# Patient Record
Sex: MALE | Race: WHITE | NOT HISPANIC OR LATINO | URBAN - METROPOLITAN AREA
[De-identification: names, ages, dates, MRNs, and addresses within clinical notes are randomized per-mention and may not be internally consistent; named-entity substitution may affect disease eponyms.]

---

## 2017-08-08 ENCOUNTER — NEW PATIENT (OUTPATIENT)
Dept: URBAN - METROPOLITAN AREA CLINIC 44 | Facility: CLINIC | Age: 52
End: 2017-08-08
Payer: COMMERCIAL

## 2017-08-08 PROCEDURE — 92004 COMPRE OPH EXAM NEW PT 1/>: CPT | Performed by: OPTOMETRIST

## 2017-08-08 PROCEDURE — 92250 FUNDUS PHOTOGRAPHY W/I&R: CPT | Performed by: OPTOMETRIST

## 2017-08-08 ASSESSMENT — KERATOMETRY
OS: 45.50
OD: 45.75

## 2017-08-08 ASSESSMENT — VISUAL ACUITY
OS: 20/150
OD: 20/20

## 2017-08-08 ASSESSMENT — INTRAOCULAR PRESSURE
OS: 16
OD: 18

## 2017-09-15 ENCOUNTER — Encounter (OUTPATIENT)
Dept: URBAN - METROPOLITAN AREA CLINIC 44 | Facility: CLINIC | Age: 52
End: 2017-09-15
Payer: COMMERCIAL

## 2017-09-15 DIAGNOSIS — H25.13 AGE-RELATED NUCLEAR CATARACT, BILATERAL: Primary | ICD-10-CM

## 2017-09-15 PROCEDURE — 92025 CPTRIZED CORNEAL TOPOGRAPHY: CPT | Performed by: OPHTHALMOLOGY

## 2017-09-15 PROCEDURE — 99213 OFFICE O/P EST LOW 20 MIN: CPT | Performed by: PHYSICIAN ASSISTANT

## 2017-09-15 RX ORDER — AMLODIPINE BESYLATE 10 MG/1
10 MG TABLET ORAL
Qty: 0 | Refills: 0 | Status: INACTIVE
Start: 2017-09-15 | End: 2018-12-28

## 2017-09-15 RX ORDER — LISINOPRIL 20 MG/1
20 MG TABLET ORAL
Qty: 0 | Refills: 0 | Status: INACTIVE
Start: 2017-09-15 | End: 2018-12-28

## 2017-09-26 ENCOUNTER — FOLLOW UP ESTABLISHED (OUTPATIENT)
Dept: URBAN - METROPOLITAN AREA CLINIC 44 | Facility: CLINIC | Age: 52
End: 2017-09-26
Payer: COMMERCIAL

## 2017-09-26 DIAGNOSIS — H43.393 OTHER VITREOUS OPACITIES, BILATERAL: ICD-10-CM

## 2017-09-26 DIAGNOSIS — H52.223 REGULAR ASTIGMATISM, BILATERAL: ICD-10-CM

## 2017-09-26 DIAGNOSIS — Z79.4 LONG TERM (CURRENT) USE OF INSULIN: ICD-10-CM

## 2017-09-26 DIAGNOSIS — H25.12 AGE-RELATED NUCLEAR CATARACT, LEFT EYE: Primary | ICD-10-CM

## 2017-09-26 DIAGNOSIS — H52.213 IRREGULAR ASTIGMATISM, BILATERAL: ICD-10-CM

## 2017-09-26 PROCEDURE — 92012 INTRM OPH EXAM EST PATIENT: CPT | Performed by: OPHTHALMOLOGY

## 2017-09-26 ASSESSMENT — INTRAOCULAR PRESSURE
OD: 18
OS: 18

## 2017-10-05 ENCOUNTER — SURGERY (OUTPATIENT)
Dept: URBAN - METROPOLITAN AREA SURGERY 19 | Facility: SURGERY | Age: 52
End: 2017-10-05
Payer: COMMERCIAL

## 2017-10-05 PROCEDURE — 66984 XCAPSL CTRC RMVL W/O ECP: CPT | Performed by: OPHTHALMOLOGY

## 2017-10-06 ENCOUNTER — POST OP (OUTPATIENT)
Dept: URBAN - METROPOLITAN AREA CLINIC 44 | Facility: CLINIC | Age: 52
End: 2017-10-06

## 2017-10-06 PROCEDURE — 99024 POSTOP FOLLOW-UP VISIT: CPT | Performed by: OPTOMETRIST

## 2017-10-06 ASSESSMENT — INTRAOCULAR PRESSURE: OS: 24

## 2017-10-10 ENCOUNTER — POST OP (OUTPATIENT)
Dept: URBAN - METROPOLITAN AREA CLINIC 44 | Facility: CLINIC | Age: 52
End: 2017-10-10

## 2017-10-10 DIAGNOSIS — Z96.1 PRESENCE OF INTRAOCULAR LENS: Primary | ICD-10-CM

## 2017-10-10 PROCEDURE — 99024 POSTOP FOLLOW-UP VISIT: CPT | Performed by: OPTOMETRIST

## 2017-10-10 ASSESSMENT — INTRAOCULAR PRESSURE
OD: 16
OS: 16

## 2017-10-10 ASSESSMENT — VISUAL ACUITY
OS: 20/20
OD: 20/20

## 2017-11-08 ENCOUNTER — POST OP (OUTPATIENT)
Dept: URBAN - METROPOLITAN AREA CLINIC 44 | Facility: CLINIC | Age: 52
End: 2017-11-08

## 2017-11-08 PROCEDURE — 92015 DETERMINE REFRACTIVE STATE: CPT | Performed by: OPTOMETRIST

## 2017-11-08 PROCEDURE — 99024 POSTOP FOLLOW-UP VISIT: CPT | Performed by: OPTOMETRIST

## 2017-11-08 ASSESSMENT — VISUAL ACUITY
OD: 20/25
OS: 20/20

## 2017-11-08 ASSESSMENT — INTRAOCULAR PRESSURE
OD: 15
OS: 15

## 2018-12-07 ENCOUNTER — FOLLOW UP ESTABLISHED (OUTPATIENT)
Dept: URBAN - METROPOLITAN AREA CLINIC 44 | Facility: CLINIC | Age: 53
End: 2018-12-07
Payer: COMMERCIAL

## 2018-12-07 DIAGNOSIS — H26.492 OTHER SECONDARY CATARACT, LEFT EYE: ICD-10-CM

## 2018-12-07 DIAGNOSIS — E11.9 TYPE 2 DIABETES MELLITUS WITHOUT COMPLICATIONS: ICD-10-CM

## 2018-12-07 DIAGNOSIS — H25.11 AGE-RELATED NUCLEAR CATARACT, RIGHT EYE: Primary | ICD-10-CM

## 2018-12-07 PROCEDURE — 92014 COMPRE OPH EXAM EST PT 1/>: CPT | Performed by: OPTOMETRIST

## 2018-12-07 ASSESSMENT — KERATOMETRY
OD: 45.13
OS: 45.38

## 2018-12-07 ASSESSMENT — VISUAL ACUITY
OD: 20/20
OS: 20/30

## 2018-12-07 ASSESSMENT — INTRAOCULAR PRESSURE
OS: 17
OD: 17

## 2018-12-28 ENCOUNTER — Encounter (OUTPATIENT)
Dept: URBAN - METROPOLITAN AREA CLINIC 44 | Facility: CLINIC | Age: 53
End: 2018-12-28
Payer: COMMERCIAL

## 2018-12-28 DIAGNOSIS — Z01.818 ENCOUNTER FOR OTHER PREPROCEDURAL EXAMINATION: Primary | ICD-10-CM

## 2018-12-28 PROCEDURE — 99213 OFFICE O/P EST LOW 20 MIN: CPT | Performed by: PHYSICIAN ASSISTANT

## 2019-01-14 ENCOUNTER — SURGERY (OUTPATIENT)
Dept: URBAN - METROPOLITAN AREA SURGERY 19 | Facility: SURGERY | Age: 54
End: 2019-01-14
Payer: COMMERCIAL

## 2019-01-14 PROCEDURE — 66821 AFTER CATARACT LASER SURGERY: CPT | Performed by: OPHTHALMOLOGY

## 2020-12-07 PROBLEM — G62.9 SENSORY NEUROPATHY: Status: ACTIVE | Noted: 2020-12-07

## 2021-10-20 ENCOUNTER — HOSPITAL ENCOUNTER (OUTPATIENT)
Dept: MRI IMAGING | Age: 56
Discharge: HOME | End: 2021-10-20
Attending: PSYCHIATRY & NEUROLOGY

## 2021-10-20 DIAGNOSIS — M48.02 CERVICAL STENOSIS OF SPINAL CANAL: ICD-10-CM

## 2021-10-20 PROCEDURE — 72141 MRI NECK SPINE W/O DYE: CPT

## 2022-03-18 PROBLEM — G62.9 SENSORY NEUROPATHY: Status: ACTIVE | Noted: 2020-12-07

## 2022-06-13 ENCOUNTER — TELEPHONE (OUTPATIENT)
Dept: NEUROLOGY | Age: 57
End: 2022-06-13

## 2022-06-13 DIAGNOSIS — M79.2 NEUROPATHIC PAIN: Primary | ICD-10-CM

## 2022-06-13 NOTE — TELEPHONE ENCOUNTER
Patient needs Dr. Kevin Adams to authorize an early fill on his Norco on the 15th because he is leaving town. He is using [facility]    His next appt is 11/4/22.

## 2022-06-14 RX ORDER — HYDROCODONE BITARTRATE AND ACETAMINOPHEN 10; 325 MG/1; MG/1
1 TABLET ORAL EVERY 8 HOURS PRN
Qty: 90 TABLET | Refills: 0 | Status: SHIPPED | OUTPATIENT
Start: 2022-06-14 | End: 2022-07-14

## 2022-06-15 ENCOUNTER — PATIENT MESSAGE (OUTPATIENT)
Dept: NEUROLOGY | Age: 57
End: 2022-06-15

## 2022-06-15 NOTE — TELEPHONE ENCOUNTER
From: Santino Trimble  To: Dr. Joy Blevins: 6/15/2022 11:39 AM EDT  Subject: Earlier Discussion    Yes, I had called on Monday and spoke with Gaviota about a prescription that needs to be refilled today due to myself going on vacation and I wouldn't be back until after the original refill date. She talked to Dr. Steven Flanagan and the request was sent to [facility] in [facility], so I called them just a minute ago and they said that they needed to get an approval from Dr. Steven Flanagan and then they could fill it. Would someone please contact them and tell them to do this.    Thanks   Roger Friedman

## 2022-08-10 ENCOUNTER — PATIENT MESSAGE (OUTPATIENT)
Dept: NEUROLOGY | Age: 57
End: 2022-08-10

## 2022-08-10 DIAGNOSIS — M79.2 NEUROPATHIC PAIN: Primary | ICD-10-CM

## 2022-08-11 RX ORDER — HYDROCODONE BITARTRATE AND ACETAMINOPHEN 10; 325 MG/1; MG/1
1 TABLET ORAL EVERY 8 HOURS PRN
Qty: 90 TABLET | Refills: 0 | Status: SHIPPED | OUTPATIENT
Start: 2022-08-13 | End: 2022-09-06 | Stop reason: SDUPTHER

## 2022-08-11 NOTE — TELEPHONE ENCOUNTER
Sharon Hogan MA 8/10/2022 11:03 AM EDT      ----- Message -----  From: Nelson Fabian  Sent: 8/10/2022 4:56 AM EDT  To: , *  Subject: Yojana Haynes,  Would you please refill my Norco 10/325? This request is early due to myself being unavailable for a couple of days. Last month I picked up my RX on 7/15/22, so the way my meds calculate out, the 30 days supply for July ends 8/13/22. Could you check this request and have my pharmacy [facility]) fill it on 8/13? If you will do this, it will be greatly appreciated. My appointment with you still stands on 11/4/22 @ 10:45. Thanks in advance.   Char Hudson

## 2022-10-09 ENCOUNTER — PATIENT MESSAGE (OUTPATIENT)
Dept: NEUROLOGY | Age: 57
End: 2022-10-09

## 2022-10-09 DIAGNOSIS — M79.2 NEUROPATHIC PAIN: ICD-10-CM

## 2022-10-10 NOTE — TELEPHONE ENCOUNTER
Request received via [facility]    Chart reviewed. .  Pharmacy up to date.     Scheduled F/U: 11/4/2022  Last Seen:   4/26/2022

## 2022-10-10 NOTE — TELEPHONE ENCOUNTER
From: Sandy Quiñones  To: Dr. Wing Lisbon: 10/9/2022 4:19 AM EDT  Subject: Altaf Eid,   Could you please have my Norco medication 10/325 refilled? I picked up Septembers prescription on 9/13/22. October's request would be due on 10/13/22. My pharmacy is [facility] in [facility]. Thanks in advance and I'll seeya next month.    Best wishes,   Makayla Neil

## 2022-10-11 RX ORDER — HYDROCODONE BITARTRATE AND ACETAMINOPHEN 10; 325 MG/1; MG/1
1 TABLET ORAL EVERY 8 HOURS PRN
Qty: 90 TABLET | Refills: 0 | Status: SHIPPED | OUTPATIENT
Start: 2022-11-12 | End: 2022-12-12

## 2022-10-11 RX ORDER — HYDROCODONE BITARTRATE AND ACETAMINOPHEN 10; 325 MG/1; MG/1
1 TABLET ORAL EVERY 8 HOURS PRN
Qty: 90 TABLET | Refills: 0 | Status: SHIPPED | OUTPATIENT
Start: 2022-10-13 | End: 2022-11-12

## 2022-11-04 ENCOUNTER — PATIENT MESSAGE (OUTPATIENT)
Dept: NEUROLOGY | Age: 57
End: 2022-11-04

## 2022-11-04 ENCOUNTER — OFFICE VISIT (OUTPATIENT)
Dept: NEUROLOGY | Age: 57
End: 2022-11-04

## 2022-11-04 VITALS
WEIGHT: 179.6 LBS | BODY MASS INDEX: 25.77 KG/M2 | HEART RATE: 77 BPM | SYSTOLIC BLOOD PRESSURE: 133 MMHG | DIASTOLIC BLOOD PRESSURE: 92 MMHG

## 2022-11-04 DIAGNOSIS — G60.8 PERIPHERAL SENSORY NEUROPATHY: ICD-10-CM

## 2022-11-04 DIAGNOSIS — M79.2 NEUROPATHIC PAIN: Primary | ICD-10-CM

## 2022-11-04 DIAGNOSIS — M48.02 CERVICAL STENOSIS OF SPINAL CANAL: ICD-10-CM

## 2022-11-04 PROCEDURE — 99213 OFFICE O/P EST LOW 20 MIN: CPT | Performed by: PSYCHIATRY & NEUROLOGY

## 2022-11-04 ASSESSMENT — PATIENT HEALTH QUESTIONNAIRE - PHQ9
SUM OF ALL RESPONSES TO PHQ QUESTIONS 1-9: 0
SUM OF ALL RESPONSES TO PHQ QUESTIONS 1-9: 0
SUM OF ALL RESPONSES TO PHQ9 QUESTIONS 1 & 2: 0
2. FEELING DOWN, DEPRESSED OR HOPELESS: 0
1. LITTLE INTEREST OR PLEASURE IN DOING THINGS: 0
SUM OF ALL RESPONSES TO PHQ QUESTIONS 1-9: 0
SUM OF ALL RESPONSES TO PHQ QUESTIONS 1-9: 0

## 2022-11-04 NOTE — PROGRESS NOTES
Venkat Cameron   [address], [address] [facility]  Phone: [phone] Fax [phone]  Damaso Cha MD      Patient: Todd Campbell  Provider: Damaso Cha MD    CC:   Chief Complaint   Patient presents with    Follow-up     Neuralgia and neuritis  Lesion of plantar nerve, unspecified lower limb     Referring Provider:    History of Present Illness:     Todd Campbell is a 62 y.o. RH male who presents for follow-up of peripheral neuropathy. He is unaccompanied for today's visit. He he was last seen April 2022. He presents for follow-up and continued management of a painful peripheral neuropathy affecting his feet. Previous nerve conduction studies are reviewed below did show evidence of relatively mild plantar neuropathy bilaterally. He also has fairly significant degenerative disc disease of the cervical spine with central canal stenosis s/p fusion C5-6 and C6-7. Current medications include:  Gabapentin 600 mg 3 times a day  Hydrocodone-acetaminophen  mg 3 times a day as needed     Previous medication trials include: Pregabalin    Patient presents today for follow-up. Overall things been relatively stable. Continues to take hydrocodone-acetaminophen in addition to gabapentin for painful peripheral neuropathy of his feet. Pain can be worse when standing for prolonged periods of time as well as with walking. He has some chronic neck pain and stiffness which remains relatively stable. MRI of the cervical spine has shown areas of stenosis with cord encroachment at levels above an area of a prior cervical fusion. He has been hesitant about any neurosurgery referrals as symptoms have been relatively stable so we have been continuing to monitor for any worsening signs. Of note, he does have a history of a right wrist drop, suspected to be an isolated radial nerve palsy, which appears to have resolved.        Review of Systems:   Review of Systems   Constitutional: Negative for fever. HENT:  Negative for voice change. Eyes:  Negative for visual disturbance. Respiratory:  Negative for cough. Cardiovascular:  Negative for chest pain. Gastrointestinal:  Negative for abdominal pain. Genitourinary:  Negative for frequency. Musculoskeletal:  Positive for arthralgias, gait problem and myalgias. Skin:  Negative for rash. Neurological:  Positive for numbness. Negative for tremors. Psychiatric/Behavioral:  Negative for decreased concentration and sleep disturbance. Lab/Imaging Review:   I REVIEWED PERTINENT LABS, IMAGES, AND REPORTS WITH THE PATIENT PERSONALLY, DIRECTLY AND FULLY. THE MOST PERTINENT FINDINGS ARE NOTED BELOW:    MRI Cervical Spine October 2021:  IMPRESSION  1. Postop changes related to C5-C7 anterior discectomy and fusion. No spinal canal stenosis at these levels. 2. Moderate spinal canal stenosis at the C3-4 and C4-5 levels due to disc  osteophyte complex at each level. Neural foraminal stenosis at these levels also noted as described above. 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 MRI Cervical Spine September 2020:  1. ACDF spanning C5-C7. No significant central canal or neuroforaminal stenosis at these levels. 2.  At C3-C4, there is a moderate central canal stenosis and severe bilateral neuroforaminal stenosis. 3.  At C4-C5, there is severe central canal stenosis and severe bilateral neuroforaminal stenosis. 4.  At C7-T1, there is mild to moderate bilateral neuroforaminal stenosis. MRI Brain August 2020:  1. No acute intracranial abnormality. 2.  There are a few opacified right mastoid air cells. MRI Lumbar Spine July 2020:  1.   Small to moderate right paracentral disc extrusion appears to abut immediately displace the proximal right S1 nerve root within the moderately narrowed right lateral recess when combined with facet hypertrophy and ligamentum flavum hypertrophy. 2.  Mild diffuse disc bulging at multiple lumbar levels with mild bilateral inferior neural foraminal narrowing and spinal canal narrowing but no nerve root impingement. Past Medical History:     Past medical history, surgical history, social history, family history, medications, and allergies were reviewed and updated as appropriate. PAST MEDICAL HISTORY:  Past Medical History:   Diagnosis Date    Hypertension     Sensory neuropathy 12/7/2020     PAST SURGICAL HISTORY:   Past Surgical History:   Procedure Laterality Date    CERVICAL FUSION       FAMILY HISTORY:  No family history on file. SOCIAL HISTORY:  Social History     Socioeconomic History    Marital status:      Spouse name: None    Number of children: None    Years of education: None    Highest education level: None   Tobacco Use    Smoking status: Former     Types: Cigarettes     Quit date: 4/2/2012     Years since quitting: 10.6    Smokeless tobacco: Current    Tobacco comments:     Quit smoking: dip   Substance and Sexual Activity    Alcohol use: Never       Medications/Allergies:     MEDICATIONS:   Outpatient Encounter Medications as of 11/4/2022   Medication Sig Dispense Refill    [START ON 12/12/2022] HYDROcodone-acetaminophen (NORCO)  MG per tablet Take 1 tablet by mouth every 8 hours as needed for Pain for up to 30 days. Intended supply: 30 days 90 tablet 0    HYDROcodone-acetaminophen (NORCO)  MG per tablet Take 1 tablet by mouth every 8 hours as needed for Pain for up to 30 days. Intended supply: 30 days 90 tablet 0    [START ON 11/12/2022] HYDROcodone-acetaminophen (NORCO)  MG per tablet Take 1 tablet by mouth every 8 hours as needed for Pain for up to 30 days.  Intended supply: 30 days 90 tablet 0    escitalopram (LEXAPRO) 20 MG tablet Take 20 mg by mouth daily      gabapentin (NEURONTIN) 300 MG capsule Take 600 mg by mouth 3 times daily. lisinopril (PRINIVIL;ZESTRIL) 20 MG tablet Take 20 mg by mouth daily       No facility-administered encounter medications on file as of 11/4/2022. ALLERGIES:  No Known Allergies      Physical Exam:     BP (!) 133/92 (Site: Right Upper Arm, Position: Sitting)   Pulse 77   Wt 179 lb 9.6 oz (81.5 kg)   BMI 25.77 kg/m²     General Exam:  General: Well developed, well nourished, in no apparent distress. HEENT: Normocephalic, atraumatic. Sclera anicteric. Oropharynx clear. Neck: Supple without masses  Cardiovascular: Regular rate and rhythm. No carotid bruits. Lungs: Non-labored breathing. Abdomen: Soft, nontender, nondistended. Extremities: Peripheral pulses intact. No edema and no rashes. Neurological Exam:      MS/Language/Speech:  Alert. Oriented to person, place, and time. Language fluent. Speech normal.      Cranial Nerves: PERRL. Eye movements full with normal pursuits. No nystagmus. Face was symmetric with good activation and normal sensation. Tongue and palate were midline. Shoulder shrug symmetric. Motor: Strength in the upper extremities is intact. Tone continues to be normal.  Strength in the lower extremities appears intact. Abnormal Movements: There was no tremor or hyperkinetic movements. Sensory: Loss of pinprick and mild loss of vibratory sensation of the distal lower extremities bilaterally. Cerebellar: No ataxia or dysmetria. Reflexes (R/L): Biceps (1+/1+), Brachioradialis (1+/1+), Patellar (1+/1+), Ankles (0+/0+). Millard's was negative. Toes mute. Gait: He has no difficulty rising from a chair but he clearly walks with an antalgic gait due to bilateral foot pain. Assessment and Plan:     Boubacar Mosley is a 62 y.o. male who presents with the following issues:     Jose A Leija was seen today for follow-up. Diagnoses and all orders for this visit:    Neuropathic pain  -     HYDROcodone-acetaminophen (NORCO)  MG per tablet;  Take 1 tablet by mouth every 8 hours as needed for Pain for up to 30 days. Intended supply: 30 days    Peripheral sensory neuropathy  -     HYDROcodone-acetaminophen (NORCO)  MG per tablet; Take 1 tablet by mouth every 8 hours as needed for Pain for up to 30 days. Intended supply: 30 days    Cervical stenosis of spinal canal       He presents for follow-up and continued management of a painful sensory neuropathy affecting the plantar nerves of both feet. Would consider repeat nerve conduction studies to look for interval progression should symptoms worsen. In the meantime, we will continue symptom management with Norco as needed as well as gabapentin 600 mg 3 times a day. MRI of the cervical spine has been reviewed. There are areas of moderate central canal stenosis at C3-C4 and C4-C5 due to disc osteophyte complexes at these levels. There is also multilevel neuroforaminal stenosis. We have discussed the possibility of a repeat neurosurgery referral but we will hold off for now as symptoms have been relatively stable. Follow up in 6 months. Signature: Gabino Hill MD      Date:  11/5/2022    [address] [place] [address] Neurology   [address]  Ph: [phone]  Fax: [phone]         I have personally interviewed and examined Mr. Rafia Rubin and I have personally reviewed all relevant records including labs and imaging as noted above. I have written all aspects of this note. More than 50% of this time was used for counseling regarding my diagnosis, prognosis, and plans for management. Total visit time: 25 minutes.

## 2022-11-05 RX ORDER — HYDROCODONE BITARTRATE AND ACETAMINOPHEN 10; 325 MG/1; MG/1
1 TABLET ORAL EVERY 8 HOURS PRN
Qty: 90 TABLET | Refills: 0 | Status: SHIPPED | OUTPATIENT
Start: 2022-12-12 | End: 2023-01-11

## 2022-11-05 ASSESSMENT — ENCOUNTER SYMPTOMS
COUGH: 0
ABDOMINAL PAIN: 0
VOICE CHANGE: 0

## 2023-01-05 DIAGNOSIS — G60.8 PERIPHERAL SENSORY NEUROPATHY: ICD-10-CM

## 2023-01-05 DIAGNOSIS — M79.2 NEUROPATHIC PAIN: ICD-10-CM

## 2023-01-06 RX ORDER — HYDROCODONE BITARTRATE AND ACETAMINOPHEN 10; 325 MG/1; MG/1
1 TABLET ORAL EVERY 8 HOURS PRN
Qty: 90 TABLET | Refills: 0 | Status: SHIPPED | OUTPATIENT
Start: 2023-01-06 | End: 2023-02-05

## 2023-02-05 DIAGNOSIS — G60.8 PERIPHERAL SENSORY NEUROPATHY: ICD-10-CM

## 2023-02-05 DIAGNOSIS — M79.2 NEUROPATHIC PAIN: ICD-10-CM

## 2023-02-06 RX ORDER — HYDROCODONE BITARTRATE AND ACETAMINOPHEN 10; 325 MG/1; MG/1
1 TABLET ORAL EVERY 8 HOURS PRN
Qty: 90 TABLET | Refills: 0 | Status: SHIPPED | OUTPATIENT
Start: 2023-02-06 | End: 2023-03-08

## 2023-02-21 ENCOUNTER — PATIENT MESSAGE (OUTPATIENT)
Dept: NEUROLOGY | Age: 58
End: 2023-02-21

## 2023-02-23 NOTE — TELEPHONE ENCOUNTER
Jada Patel MA 2/21/2023 2:52 PM EST    FYI  ----- Message -----  From: Silvana Negro MA  Sent: 2/21/2023 10:35 AM EST  To: Jada Patel MA  Subject: FW: Referral       ----- Message -----  From: Sandy Quiñones  Sent: 2/21/2023 10:34 AM EST  To: , *  Subject: Referral     Dr Mihaela Eid,   Will you be on the lookout for a referral from my wife's doctor, Karen Leon. She is sending her to see you in the future. My wife's name is Gosia Khan. The referral was supposed to be sent this morning. Thanks in advance for looking into this matter.    Makayla Neil

## 2023-03-06 DIAGNOSIS — G60.8 PERIPHERAL SENSORY NEUROPATHY: ICD-10-CM

## 2023-03-06 DIAGNOSIS — M79.2 NEUROPATHIC PAIN: ICD-10-CM

## 2023-03-06 RX ORDER — HYDROCODONE BITARTRATE AND ACETAMINOPHEN 10; 325 MG/1; MG/1
1 TABLET ORAL EVERY 8 HOURS PRN
Qty: 90 TABLET | Refills: 0 | Status: SHIPPED | OUTPATIENT
Start: 2023-03-06 | End: 2023-04-05

## 2023-03-23 ENCOUNTER — PATIENT MESSAGE (OUTPATIENT)
Dept: NEUROLOGY | Age: 58
End: 2023-03-23

## 2023-03-27 NOTE — TELEPHONE ENCOUNTER
Oliver Medel MA 3/24/2023 2:07 PM EDT      ----- Message -----  From: Oliver Medel MA  Sent: 3/24/2023 8:58 AM EDT  To: Oliver Medel MA  Subject: FW: Date Change       ----- Message -----  From: Ramond Primrose  Sent: 3/23/2023 10:54 PM EDT  To: , *  Subject: Date Change     Dr. Sheyla Sandoval,   I'm going to need the date changed on the hydrocodone refill to be on or around the 1st of the month starting in May. I normally get it around the 12th of the month, but since I'm going on a cruise, I'll be out of town starting on the 5th - 21st of May. Could you please have the pharmacy make this happen? They normally stay with the 30 day guidelines on controlled medications, knowing that April's refill would be picked up around the 11th, that would make the May refill come close to being approx 10 days early. What can you do to make this happen before I go out of town? I definitely couldn't enjoy my vacation with my feet hurting throughout my trip, and it definitely wouldn't make my wife happy either, ifya know what I mean. Please advise the pharmacy/pharmacist to do whatever needs to be done for some kind of resolution for this to happen. Will you please notify me of the situation and what's going to be done? I would greatly appreciate it. Thanks in advance for your help in this matter. Korin Olivier ([phone]  [email or web address]

## 2023-04-24 ENCOUNTER — PATIENT MESSAGE (OUTPATIENT)
Dept: NEUROLOGY | Age: 58
End: 2023-04-24

## 2023-04-24 DIAGNOSIS — G60.8 PERIPHERAL SENSORY NEUROPATHY: ICD-10-CM

## 2023-04-24 DIAGNOSIS — M79.2 NEUROPATHIC PAIN: ICD-10-CM

## 2023-04-24 RX ORDER — HYDROCODONE BITARTRATE AND ACETAMINOPHEN 10; 325 MG/1; MG/1
1 TABLET ORAL EVERY 8 HOURS PRN
Qty: 90 TABLET | Refills: 0 | OUTPATIENT
Start: 2023-04-24 | End: 2023-05-24

## 2023-04-27 ENCOUNTER — PATIENT MESSAGE (OUTPATIENT)
Dept: NEUROLOGY | Age: 58
End: 2023-04-27

## 2023-04-27 DIAGNOSIS — M79.2 NEUROPATHIC PAIN: Primary | ICD-10-CM

## 2023-04-27 RX ORDER — HYDROCODONE BITARTRATE AND ACETAMINOPHEN 10; 325 MG/1; MG/1
1 TABLET ORAL EVERY 8 HOURS PRN
Qty: 90 TABLET | Refills: 0 | Status: SHIPPED | OUTPATIENT
Start: 2023-05-01 | End: 2023-05-31

## 2023-04-27 NOTE — TELEPHONE ENCOUNTER
Merly Correa MA 4/25/2023 9:46 AM EDT      ----- Message -----  From: Cristian Huffmangwen"  Sent: 4/24/2023 5:59 PM EDT  To: , *  Subject: Early Refill Reminder     Dr. Mela Souza,   This is the reminder that you requested for me to send to you in regards to my pharmacy being notified of the early refill request on the Hydrocodone being filled on May 1st due to myself being out of town, as I would be gone during the normal refill date. You had asked me to remind you to send you a reminder when that refill was getting closer, so I will need it filled on the 1st of May. Thank you for taking care of this situation. I had requested the refill on the normal refill request in the menu, but I got a return message advising me to contact my provider, so I hope this is the route I need to take. Thanks again!

## 2023-04-28 RX ORDER — GABAPENTIN 300 MG/1
600 CAPSULE ORAL 3 TIMES DAILY
Qty: 180 CAPSULE | Refills: 5 | Status: SHIPPED | OUTPATIENT
Start: 2023-05-01 | End: 2023-10-28

## 2023-04-28 NOTE — TELEPHONE ENCOUNTER
Evaristo Varner MA 4/28/2023 10:27 AM EDT      ----- Message -----  From: Yusuf Benavides"  Sent: 4/27/2023 10:40 AM EDT  To: , *  Subject: Gabapentin Refill     Dr. Carr Prudent,   I want to Thank You for requesting my RX hydrocodone to be filled on the 1st, I just noticed that my Gabapentin needs a new RX also. I should have been more attentive on this matter and requested it at the same time as I did on the hydrocodone. I apologize for being such a \"needy\" patient, but I really appreciate your professionalism in the Neurology study.    Thanks in Advance,   Marc Cherry

## 2023-05-23 DIAGNOSIS — G60.8 PERIPHERAL SENSORY NEUROPATHY: ICD-10-CM

## 2023-05-23 DIAGNOSIS — M79.2 NEUROPATHIC PAIN: ICD-10-CM

## 2023-05-26 RX ORDER — HYDROCODONE BITARTRATE AND ACETAMINOPHEN 10; 325 MG/1; MG/1
1 TABLET ORAL EVERY 8 HOURS PRN
Qty: 90 TABLET | Refills: 0 | Status: SHIPPED | OUTPATIENT
Start: 2023-05-26 | End: 2023-06-25

## 2023-06-02 ENCOUNTER — OFFICE VISIT (OUTPATIENT)
Dept: NEUROLOGY | Age: 58
End: 2023-06-02

## 2023-06-02 VITALS
HEART RATE: 67 BPM | BODY MASS INDEX: 27.41 KG/M2 | DIASTOLIC BLOOD PRESSURE: 86 MMHG | SYSTOLIC BLOOD PRESSURE: 138 MMHG | OXYGEN SATURATION: 99 % | WEIGHT: 191 LBS

## 2023-06-02 DIAGNOSIS — M48.02 CERVICAL STENOSIS OF SPINAL CANAL: ICD-10-CM

## 2023-06-02 DIAGNOSIS — G60.8 PERIPHERAL SENSORY NEUROPATHY: Primary | ICD-10-CM

## 2023-06-02 DIAGNOSIS — M79.2 NEUROPATHIC PAIN: ICD-10-CM

## 2023-06-02 ASSESSMENT — ENCOUNTER SYMPTOMS
COUGH: 0
VOICE CHANGE: 0
ABDOMINAL PAIN: 0

## 2023-06-02 NOTE — PROGRESS NOTES
Prakash Em  [address], [address] [facility], [address] [place] [address]  Phone: [phone] Fax [phone]  Med Amador MD      Patient: Afua Piper  Provider: Med Amador MD    CC:   Chief Complaint   Patient presents with    Follow-up     Referring Provider:    History of Present Illness:     Afua Piper is a 62 y.o. RH male who presents for follow-up of peripheral neuropathy. He is unaccompanied for today's visit. He he was last seen November 2022. He presents for follow-up and continued management of a painful peripheral neuropathy affecting his feet. Previous nerve conduction studies are reviewed below did show evidence of relatively mild plantar neuropathy bilaterally. He also has fairly significant degenerative disc disease of the cervical spine with central canal stenosis s/p fusion C5-6 and C6-7. Current medications include:  Gabapentin 600 mg 3 times a day  Hydrocodone-acetaminophen  mg 3 times a day as needed     Previous medication trials include: Pregabalin    Recently went on a cruise and did have some breakthrough pain to the hot patio in the sun but ultimately did well. Pain continues to be manageable on his current regimen which consist of hydrocodone-acetaminophen as well as gabapentin. He has a painful sensory neuropathy involving the plantar nerves of the feet but he has not had nerve conduction studies in several years. Pain can often be worse with standing for prolonged periods of time. Additionally there is a history of chronic neck pain and stiffness which remains relatively stable. MRI of the cervical spine has shown areas of stenosis with cord encroachment at levels above an area of a prior cervical fusion. He has been hesitant about any neurosurgery referrals as symptoms have been relatively stable so we have been continuing to monitor for any worsening signs. Review of Systems:   Review of Systems   Constitutional:  Negative for fever.

## 2023-06-23 DIAGNOSIS — M79.2 NEUROPATHIC PAIN: ICD-10-CM

## 2023-06-23 DIAGNOSIS — G60.8 PERIPHERAL SENSORY NEUROPATHY: ICD-10-CM

## 2023-06-27 DIAGNOSIS — M79.2 NEUROPATHIC PAIN: Primary | ICD-10-CM

## 2023-06-27 RX ORDER — HYDROCODONE BITARTRATE AND ACETAMINOPHEN 10; 325 MG/1; MG/1
1 TABLET ORAL EVERY 8 HOURS PRN
Qty: 90 TABLET | Refills: 0 | OUTPATIENT
Start: 2023-06-27 | End: 2023-07-27

## 2023-06-28 RX ORDER — HYDROCODONE BITARTRATE AND ACETAMINOPHEN 10; 325 MG/1; MG/1
1 TABLET ORAL EVERY 8 HOURS PRN
Qty: 90 TABLET | Refills: 0 | Status: SHIPPED | OUTPATIENT
Start: 2023-06-28 | End: 2023-07-28

## 2023-07-24 DIAGNOSIS — M79.2 NEUROPATHIC PAIN: ICD-10-CM

## 2023-07-26 RX ORDER — HYDROCODONE BITARTRATE AND ACETAMINOPHEN 10; 325 MG/1; MG/1
1 TABLET ORAL EVERY 8 HOURS PRN
Qty: 90 TABLET | Refills: 0 | Status: SHIPPED | OUTPATIENT
Start: 2023-07-26 | End: 2023-08-25

## 2023-08-21 DIAGNOSIS — M79.2 NEUROPATHIC PAIN: ICD-10-CM

## 2023-08-22 RX ORDER — HYDROCODONE BITARTRATE AND ACETAMINOPHEN 10; 325 MG/1; MG/1
1 TABLET ORAL EVERY 8 HOURS PRN
Qty: 90 TABLET | Refills: 0 | Status: SHIPPED | OUTPATIENT
Start: 2023-08-22 | End: 2023-09-21

## 2023-09-05 ENCOUNTER — PROCEDURE VISIT (OUTPATIENT)
Dept: NEUROLOGY | Age: 58
End: 2023-09-05

## 2023-09-05 DIAGNOSIS — R20.0 NUMBNESS AND TINGLING OF BOTH FEET: ICD-10-CM

## 2023-09-05 DIAGNOSIS — R20.2 NUMBNESS AND TINGLING OF BOTH FEET: ICD-10-CM

## 2023-09-05 DIAGNOSIS — R20.8 BURNING SENSATION OF FEET: Primary | ICD-10-CM

## 2023-09-05 DIAGNOSIS — M79.2 NEUROPATHIC PAIN: ICD-10-CM

## 2023-09-05 DIAGNOSIS — G60.8 PERIPHERAL SENSORY NEUROPATHY: ICD-10-CM

## 2023-09-05 PROCEDURE — 95910 NRV CNDJ TEST 7-8 STUDIES: CPT | Performed by: PSYCHIATRY & NEUROLOGY

## 2023-09-05 PROCEDURE — 95885 MUSC TST DONE W/NERV TST LIM: CPT | Performed by: PSYCHIATRY & NEUROLOGY

## 2023-09-05 NOTE — PROGRESS NOTES
EMG/Nerve Conduction Study Procedure Note  [address] [place] [address], [address] [place] [address],[address]   [phone]      Hx:    Exam:     62 y.o.   male   EMG Lowers. .. Compared to 12/7/2020 demonstrated absent median and lateral plantar sensory nerve action potentials. Otherwise normal.  Neuropathy polyneuropathy. Sensory possible. Referring: Dr. Anika Dupont  PCP: Dr. Monika Cuellar  Technologist: Leticia Ngo      Height: 5 foot 10 inches      Summary               of EMG lower extremity muscles as noted below. Dr. Gaurav Mccann testing lowers. Compare. Controlled environmental factors / EMG lab. Temperature. NCV : sensory segments:    Abnormal = the amplitude SNAP bilaterally is markedly reduced and attenuated. NCV plantar sensory segments:     Deferred. NCV Motor MCV segments:     Normal bilateral peroneal bilateral tibial MCV. F-wave studies:         Normal bilateral peroneal and tibial F waves. H-REFLEX Studies:    Normal bilateral H-Reflexes. Alphonso Fothergill NEEDLE EMG:   Tested muscles[de-identified]    Bilateral TA MG VL mmm :::  normal = =   (some PE)  Normal insertional activity and interference pattern/recruitment. No fasciculations fibrillations positive sharp waves. Normal MUP. No BSS AP. No giant MUP. No myotonia. No upper motor neuron sign. INTERPRETATION: THESE FINDINGS ARE ELECTROPHYSIOLOGIC EVIDENCE OF MINIMAL DISTAL SENSORY NEUROPATHY POINT TOWARDS AN AXONAL TYPE. POSSIBLY SMALL FIBER NEUROPATHY. NO OTHER DEFINITIVE NEUROPATHIC FEATURES. No myopathy myotonia or fasciculations. CONCLUSION:      Suggestive evidence of sensory neuropathy possibly axonal.      Procedure Details:      Some correlates with sensory neuropathy or possible small fiber neuropathy. Patient made aware of                  Please Note[de-identified]     Data and waveforms * filed under Procedure category [facility]. See Procedure Files for complete data pages.                 Matias Patrick MD  Consultative

## 2023-09-20 DIAGNOSIS — M79.2 NEUROPATHIC PAIN: ICD-10-CM

## 2023-09-22 ENCOUNTER — PATIENT MESSAGE (OUTPATIENT)
Dept: NEUROLOGY | Age: 58
End: 2023-09-22

## 2023-09-22 DIAGNOSIS — M79.2 NEUROPATHIC PAIN: ICD-10-CM

## 2023-09-22 RX ORDER — HYDROCODONE BITARTRATE AND ACETAMINOPHEN 10; 325 MG/1; MG/1
1 TABLET ORAL EVERY 8 HOURS PRN
Qty: 90 TABLET | Refills: 0 | Status: SHIPPED | OUTPATIENT
Start: 2023-09-22 | End: 2023-10-22

## 2023-09-22 RX ORDER — GABAPENTIN 300 MG/1
600 CAPSULE ORAL 3 TIMES DAILY
Qty: 180 CAPSULE | Refills: 5 | Status: SHIPPED | OUTPATIENT
Start: 2023-09-22 | End: 2024-03-20

## 2023-10-10 ENCOUNTER — PATIENT MESSAGE (OUTPATIENT)
Dept: NEUROLOGY | Age: 58
End: 2023-10-10

## 2023-10-10 NOTE — TELEPHONE ENCOUNTER
Tiana [place], [place] 10/10/2023 10:51 AM EDT      ----- Message -----  From: Rachel Stephenson\"  Sent: 10/10/2023 8:47 AM EDT  To: *  Subject: Mini Collins, I need a huge favor. My wife, Bruce Shah has been experiencing shoulder pain that radiates down through her right arm and into her right hand. She has lost all functions of her right arm/hand. She has seen several specialist since this has started back in April. The latest doctor is Dr. Debbie Awad with [address],, which has ordered a nerve study to be performed. His office sent the referral to your office on Thursday, October 9th. What I need done, if possible, is to get this appointment expedited as soon as they can. She's been in terrible pain and she is extremely uncomfortable and she has not had any relief from the medications that's been given, like I mentioned before, this has been going on since April and no doctors have been any help. If you could please watch for her referral letter and dispatch it to the appropriate office I would greatly appreciate it and any help you can provide will be extremely honored.    Thank You for Any Help That You Can Provide,   Lucy Cerda

## 2023-10-20 DIAGNOSIS — M79.2 NEUROPATHIC PAIN: ICD-10-CM

## 2023-10-21 RX ORDER — HYDROCODONE BITARTRATE AND ACETAMINOPHEN 10; 325 MG/1; MG/1
1 TABLET ORAL EVERY 8 HOURS PRN
Qty: 90 TABLET | Refills: 0 | Status: SHIPPED | OUTPATIENT
Start: 2023-10-21 | End: 2023-11-20

## 2023-10-23 ENCOUNTER — PATIENT MESSAGE (OUTPATIENT)
Dept: NEUROLOGY | Age: 58
End: 2023-10-23

## 2023-10-23 DIAGNOSIS — M79.2 NEUROPATHIC PAIN: Primary | ICD-10-CM

## 2023-10-24 RX ORDER — GABAPENTIN 600 MG/1
600 TABLET ORAL 3 TIMES DAILY
Qty: 90 TABLET | Refills: 5 | Status: SHIPPED | OUTPATIENT
Start: 2023-10-24 | End: 2024-04-21

## 2023-10-24 RX ORDER — OXYCODONE HYDROCHLORIDE 5 MG/1
5 TABLET ORAL EVERY 8 HOURS PRN
Qty: 90 TABLET | Refills: 0 | Status: SHIPPED | OUTPATIENT
Start: 2023-10-24 | End: 2023-11-23

## 2023-10-24 NOTE — TELEPHONE ENCOUNTER
Sent new Rx for oxycodone as a substitute for hydrocodone-acetaminophen due to being informed that the pharmacy had [place] on backorder.

## 2023-10-24 NOTE — TELEPHONE ENCOUNTER
Saumya Carrillo, [place] 10/23/2023 5:19 PM EDT      ----- Message -----  From: Enmanuel Lupe StephensonNeelam"  Sent: 10/23/2023 4:46 PM EDT  To: *  Subject: Hydrocodone     Dr. Laurie Mendoza, I have been informed that the medication Hydrocodone is on some kind of backorder at my pharmacy. They stated they have not had it in 2 plus weeks and were not sure when they would receive it. Could you please maybe substitute it for the 5mg Oxycodone tablets that I was on some time ago? This all comes as a surprise to me, so any help would greatly be appreciated. Thanks in Advance for Your Help.

## 2023-11-19 DIAGNOSIS — M79.2 NEUROPATHIC PAIN: ICD-10-CM

## 2023-11-20 ENCOUNTER — PATIENT MESSAGE (OUTPATIENT)
Dept: NEUROLOGY | Age: 58
End: 2023-11-20

## 2023-11-21 RX ORDER — OXYCODONE HYDROCHLORIDE 5 MG/1
5 TABLET ORAL EVERY 8 HOURS PRN
Qty: 90 TABLET | Refills: 0 | Status: SHIPPED | OUTPATIENT
Start: 2023-11-21 | End: 2023-12-21

## 2023-12-01 ENCOUNTER — PATIENT MESSAGE (OUTPATIENT)
Dept: NEUROLOGY | Age: 58
End: 2023-12-01

## 2023-12-04 NOTE — TELEPHONE ENCOUNTER
[address], [place] 12/1/2023 9:39 AM EST      ----- Message -----  From: Jacky Stephenson\"  Sent: 12/1/2023 9:10 AM EST  To: *  Subject: Monse Charlton,   I'm notifying you of some current changes in my office visit with you on Tuesday, December 5th @ 3:15. I had to move this appointment to January 30th @ 07:30, because of my wife's illness. She is scheduled to start taking Radiation, and Chemotherapy Treatments today at [facility], then after a few treatments in [place], the plan are to change the location to the [facility] in [place]. So in saying this, I have been commuting back and forth from [facility] to [place] just about daily to get her wherever is necessary for her treatments, etc... As far as my conditions are concerned, I do not have any new issues, problems or concerns that needs immediate attention. I am maintaining my current issues with the medication therapy that you have prescribed. I would like to continue notifying you about my medication refills as I have done previously. Thank you for understanding my current situation and your willingness to work with me on this issue. If anything else changes, I'll notify you immediately. Once again,  Thank You Very Much.     Tatiana Quiroz

## 2023-12-14 DIAGNOSIS — M79.2 NEUROPATHIC PAIN: ICD-10-CM

## 2023-12-15 RX ORDER — OXYCODONE HYDROCHLORIDE 5 MG/1
5 TABLET ORAL EVERY 8 HOURS PRN
Qty: 90 TABLET | Refills: 0 | Status: SHIPPED | OUTPATIENT
Start: 2023-12-15 | End: 2024-01-14

## 2024-01-12 DIAGNOSIS — M79.2 NEUROPATHIC PAIN: ICD-10-CM

## 2024-01-13 RX ORDER — OXYCODONE HYDROCHLORIDE 5 MG/1
5 TABLET ORAL EVERY 8 HOURS PRN
Qty: 90 TABLET | Refills: 0 | Status: SHIPPED | OUTPATIENT
Start: 2024-01-13 | End: 2024-02-12

## 2024-02-03 ENCOUNTER — PATIENT MESSAGE (OUTPATIENT)
Dept: NEUROLOGY | Age: 59
End: 2024-02-03

## 2024-02-06 NOTE — TELEPHONE ENCOUNTER
China Billy MA 2/5/2024 8:22 AM EST      ----- Message -----  From: Brandon Stephenson \"Brandon Stephenson\"  Sent: 2/3/2024 6:46 AM EST  To: *  Subject: Missed Appt.     Adelita Rangel,   I will have to apologize for not being able to do the \"Face to Face\" meeting the other morning. As your aware of, My wife Gemini has been diagnosed with cancer, she just had her first chemo/immunotherapy treatment last week and it has kicked her hind end with being nauseated, throwing up and diarrhea all at the same time.   On the morning of our meeting, she was in the [facility] getting fluids replaced and trying to get her potassium up in the normal ranges by having a potassium drip added along with NS & D5W. After 5/6 hourish, we finally were able to return home.   I've had my hands full being her Care Giver because it's something just about all the time, ( which I'm not complaining because it was in our wedding vows)  That's the reason I didn't make our meeting.   I'll call the appointment desk the first of the week (wk of the 5th) and see what times they have available.   Once again, I apologize for not being available for this meeting, I'm aware that I haven't had a office visit in several months, so it's important that we get together and discuss my issues,(which I'm not experiencing anything new or any changes, still maintaining)you have any other ideas or suggestions, please advise me this and we'll see if anyone of those will work. Thanks for understanding and working with me while I'm trying to take care of Gemini, keeping my son busy and making sure the house is also under control.   Thanks,   Brandon Stephenson  1965  [phone]

## 2024-02-13 ENCOUNTER — PATIENT MESSAGE (OUTPATIENT)
Dept: NEUROLOGY | Age: 59
End: 2024-02-13

## 2024-02-14 DIAGNOSIS — M79.2 NEUROPATHIC PAIN: Primary | ICD-10-CM

## 2024-02-15 RX ORDER — OXYCODONE HYDROCHLORIDE 5 MG/1
5 TABLET ORAL EVERY 8 HOURS PRN
Qty: 90 TABLET | Refills: 0 | Status: SHIPPED | OUTPATIENT
Start: 2024-02-15 | End: 2024-03-16

## 2024-02-15 NOTE — TELEPHONE ENCOUNTER
Lynnette Bradley, [facility] 2/14/2024 8:08 AM EST      ----- Message -----  From: Brandon Stephenson \"Brandon Stephenson\"  Sent: 2/13/2024 10:34 PM EST  To: *  Subject: Medication Refill     Dr. Rangel, I went to the medication refill section of [facility], and I noticed that the oxycodone 5mg was not listed. I know it's not quite time for them to be filled, I just like for my pharmacy ([facility]) to have it on file due to the issues I've had with them.   Could you please send in a refill for me?   I've still got to get my appointment set up, but it seems like every time I start to, I'm having to take my wife for either Lab Work, Office Visits or Infusion Therapy visits that last anywhere from 4 to 6 hours each time she goes.   If you would do this, I'd appreciate it very much.   Thanks in Advance for your Help.   Sincerely,  Brandon Stephenson

## 2024-03-14 DIAGNOSIS — M79.2 NEUROPATHIC PAIN: ICD-10-CM

## 2024-03-15 ENCOUNTER — PATIENT MESSAGE (OUTPATIENT)
Dept: NEUROLOGY | Age: 59
End: 2024-03-15

## 2024-03-15 DIAGNOSIS — M79.2 NEUROPATHIC PAIN: ICD-10-CM

## 2024-03-15 RX ORDER — OXYCODONE HYDROCHLORIDE 5 MG/1
5 TABLET ORAL EVERY 8 HOURS PRN
Qty: 90 TABLET | Refills: 0 | OUTPATIENT
Start: 2024-03-15 | End: 2024-04-14

## 2024-03-15 RX ORDER — OXYCODONE HYDROCHLORIDE 5 MG/1
5 TABLET ORAL EVERY 8 HOURS PRN
Qty: 90 TABLET | Refills: 0 | Status: SHIPPED | OUTPATIENT
Start: 2024-03-15 | End: 2024-04-14

## 2024-03-15 NOTE — TELEPHONE ENCOUNTER
Arthur Caceres MA 3/15/2024 8:23 AM EDT    I have sent you the rx request.  ----- Message -----  From: Brandon Stephenson \"Brandon Stephenson\"  Sent: 3/15/2024 8:21 AM EDT  To: *  Subject: Refill Request Denied     Dr. aRngel,  I submitted a request yesterday evening to have the oxycodone 5mg tablets refilled for March, which I always send in roughly a week in advance since I've had previous issues with [facility].   So I sent the refill request via [employer] as I always have, and I received a notice this morning that the refill request could not be done, and I would have to contact you personally to get this taken care of. It was from \"Your Care Team\".   This is the first for me, will you please send it to [facility], [facility].   Any help would greatly be appreciated and Thanks in advance for your concern.   Btw...Update on my wife, she got out of the hospital on Monday after a 5 day stint from chemotherapy side effects and continuous nausea,vomiting and diarrhea with no appetite. We're going to talk to the Oncologist to see if there is any other procedures or suggestions that she may have.   I'll tell ya something, this is one of the hardest yet helpless situation that I've ever experienced.   Just thought I'd keep you in the loop.   Thanks to advance for helping me out with my prescription request.   Brandon Stephenson

## 2024-04-12 DIAGNOSIS — M79.2 NEUROPATHIC PAIN: ICD-10-CM

## 2024-04-16 RX ORDER — OXYCODONE HYDROCHLORIDE 5 MG/1
5 TABLET ORAL EVERY 8 HOURS PRN
Qty: 90 TABLET | Refills: 0 | Status: SHIPPED | OUTPATIENT
Start: 2024-04-16 | End: 2024-05-16

## 2024-04-16 RX ORDER — GABAPENTIN 600 MG/1
600 TABLET ORAL 3 TIMES DAILY
Qty: 90 TABLET | Refills: 5 | Status: SHIPPED | OUTPATIENT
Start: 2024-04-16 | End: 2024-10-13

## 2024-05-13 DIAGNOSIS — M79.2 NEUROPATHIC PAIN: ICD-10-CM

## 2024-05-13 RX ORDER — OXYCODONE HYDROCHLORIDE 5 MG/1
5 TABLET ORAL EVERY 8 HOURS PRN
Qty: 90 TABLET | Refills: 0 | Status: SHIPPED | OUTPATIENT
Start: 2024-05-13 | End: 2024-06-12

## 2024-06-12 DIAGNOSIS — M79.2 NEUROPATHIC PAIN: ICD-10-CM

## 2024-06-13 RX ORDER — OXYCODONE HYDROCHLORIDE 5 MG/1
5 TABLET ORAL EVERY 8 HOURS PRN
Qty: 90 TABLET | Refills: 0 | Status: SHIPPED | OUTPATIENT
Start: 2024-06-13 | End: 2024-07-13

## 2024-07-09 ENCOUNTER — PATIENT MESSAGE (OUTPATIENT)
Dept: NEUROLOGY | Age: 59
End: 2024-07-09

## 2024-07-09 DIAGNOSIS — M79.2 NEUROPATHIC PAIN: ICD-10-CM

## 2024-07-09 RX ORDER — HYDROCODONE BITARTRATE AND ACETAMINOPHEN 10; 325 MG/1; MG/1
1 TABLET ORAL EVERY 8 HOURS PRN
Qty: 90 TABLET | Refills: 0 | Status: SHIPPED | OUTPATIENT
Start: 2024-07-09 | End: 2024-08-08

## 2024-07-09 NOTE — TELEPHONE ENCOUNTER
Pain medication renewed.  Switching back to Norco  mg tablets per patient request.  Will discontinue oxycodone.

## 2024-08-03 DIAGNOSIS — M79.2 NEUROPATHIC PAIN: ICD-10-CM

## 2024-08-05 RX ORDER — HYDROCODONE BITARTRATE AND ACETAMINOPHEN 10; 325 MG/1; MG/1
1 TABLET ORAL EVERY 8 HOURS PRN
Qty: 90 TABLET | Refills: 0 | Status: SHIPPED | OUTPATIENT
Start: 2024-08-05 | End: 2024-09-04

## 2024-09-02 DIAGNOSIS — M79.2 NEUROPATHIC PAIN: ICD-10-CM

## 2024-09-04 RX ORDER — HYDROCODONE BITARTRATE AND ACETAMINOPHEN 10; 325 MG/1; MG/1
1 TABLET ORAL EVERY 8 HOURS PRN
Qty: 90 TABLET | Refills: 0 | Status: SHIPPED | OUTPATIENT
Start: 2024-09-04 | End: 2024-10-04

## 2024-10-01 DIAGNOSIS — M79.2 NEUROPATHIC PAIN: ICD-10-CM

## 2024-10-01 RX ORDER — HYDROCODONE BITARTRATE AND ACETAMINOPHEN 10; 325 MG/1; MG/1
1 TABLET ORAL EVERY 8 HOURS PRN
Qty: 90 TABLET | Refills: 0 | Status: SHIPPED | OUTPATIENT
Start: 2024-10-01 | End: 2024-10-31

## 2024-10-16 ENCOUNTER — OFFICE VISIT (OUTPATIENT)
Dept: NEUROLOGY | Age: 59
End: 2024-10-16

## 2024-10-16 VITALS
BODY MASS INDEX: 25.77 KG/M2 | SYSTOLIC BLOOD PRESSURE: 153 MMHG | HEIGHT: 70 IN | WEIGHT: 180 LBS | DIASTOLIC BLOOD PRESSURE: 91 MMHG

## 2024-10-16 DIAGNOSIS — G60.8 PERIPHERAL SENSORY NEUROPATHY: Primary | ICD-10-CM

## 2024-10-16 DIAGNOSIS — M48.02 CERVICAL STENOSIS OF SPINAL CANAL: ICD-10-CM

## 2024-10-16 DIAGNOSIS — M79.2 NEUROPATHIC PAIN: ICD-10-CM

## 2024-10-16 PROCEDURE — 99213 OFFICE O/P EST LOW 20 MIN: CPT | Performed by: PSYCHIATRY & NEUROLOGY

## 2024-10-16 ASSESSMENT — ENCOUNTER SYMPTOMS
ABDOMINAL PAIN: 0
COUGH: 0
VOICE CHANGE: 0

## 2024-10-16 NOTE — PROGRESS NOTES
[facility]  [address], [address]  [place] [address]  Phone: [phone] Fax [phone]  Artem Rangel MD      Patient: Brandon Stephenson  Provider: Artem Rangel MD    CC:   Chief Complaint   Patient presents with    Follow-up     Neuropathy     Referring Provider:    History of Present Illness:     Brandon Stephenson is a 59 y.o. RH male who presents for follow-up of peripheral neuropathy.     He is unaccompanied for today's visit.  He he was last seen June 2023.     Patient presents for follow-up and continued management of a painful sensory neuropathy affecting his feet, with prior nerve conduction studies reviewed below.  Patient additionally has significant cervical degenerative disc disease with central canal stenosis s/p fusion C5-C6 and C6-C7     Current medications include:  Gabapentin 600 mg 3 times a day  Hydrocodone-acetaminophen  mg 3 times a day as needed     Previous medication trials include: Pregabalin    Patient presents today for follow-up.      It has been some time since his last visit unfortunately he has been very preoccupied at home caring for his chronically ill spouse who has cancer.    Neuropathic pain continues to be controlled on hydrocodone-acetaminophen up to 3 times a day as needed.  Patient reports that he does have some breakthrough pain in between doses.  Pain can often be worse when standing for prolonged periods of time.    He has a history of chronic neck pain and stiffness which remained stable.  Previous imaging of the cervical spine has revealed areas of stenosis with cord encroachment at levels above his area of prior cervical fusion.  He has been hesitant about neurosurgery referrals at this time as symptoms have been relatively stable but we continue to monitor.    We have also discussed pain management referrals given his refractory chronic pain the patient is hesitant about the referral because he spends most of his time attending his spouse's

## 2024-10-17 RX ORDER — HYDROCODONE BITARTRATE AND ACETAMINOPHEN 10; 325 MG/1; MG/1
1 TABLET ORAL EVERY 6 HOURS PRN
Qty: 120 TABLET | Refills: 0 | Status: SHIPPED | OUTPATIENT
Start: 2024-11-06 | End: 2024-12-06

## 2024-11-27 DIAGNOSIS — M79.2 NEUROPATHIC PAIN: ICD-10-CM

## 2024-11-27 RX ORDER — HYDROCODONE BITARTRATE AND ACETAMINOPHEN 10; 325 MG/1; MG/1
1 TABLET ORAL EVERY 6 HOURS PRN
Qty: 120 TABLET | Refills: 0 | Status: SHIPPED | OUTPATIENT
Start: 2024-11-27 | End: 2024-12-27

## 2024-12-28 DIAGNOSIS — M79.2 NEUROPATHIC PAIN: ICD-10-CM

## 2024-12-30 ENCOUNTER — PATIENT MESSAGE (OUTPATIENT)
Dept: NEUROLOGY | Age: 59
End: 2024-12-30

## 2024-12-30 DIAGNOSIS — M79.2 NEUROPATHIC PAIN: Primary | ICD-10-CM

## 2024-12-30 DIAGNOSIS — M79.2 NEUROPATHIC PAIN: ICD-10-CM

## 2024-12-30 RX ORDER — HYDROCODONE BITARTRATE AND ACETAMINOPHEN 10; 325 MG/1; MG/1
1 TABLET ORAL EVERY 6 HOURS PRN
Qty: 120 TABLET | Refills: 0 | OUTPATIENT
Start: 2024-12-30 | End: 2025-01-29

## 2024-12-30 RX ORDER — HYDROCODONE BITARTRATE AND ACETAMINOPHEN 10; 325 MG/1; MG/1
1 TABLET ORAL EVERY 6 HOURS PRN
COMMUNITY
End: 2024-12-30 | Stop reason: SDUPTHER

## 2024-12-30 RX ORDER — GABAPENTIN 600 MG/1
600 TABLET ORAL 3 TIMES DAILY
Qty: 90 TABLET | Refills: 5 | Status: SHIPPED | OUTPATIENT
Start: 2024-12-30 | End: 2025-06-28

## 2024-12-30 RX ORDER — HYDROCODONE BITARTRATE AND ACETAMINOPHEN 10; 325 MG/1; MG/1
1 TABLET ORAL EVERY 6 HOURS PRN
Qty: 120 TABLET | Refills: 0 | Status: SHIPPED | OUTPATIENT
Start: 2024-12-30 | End: 2025-01-29

## 2025-01-26 DIAGNOSIS — M79.2 NEUROPATHIC PAIN: ICD-10-CM

## 2025-01-27 RX ORDER — HYDROCODONE BITARTRATE AND ACETAMINOPHEN 10; 325 MG/1; MG/1
1 TABLET ORAL EVERY 6 HOURS PRN
Qty: 120 TABLET | Refills: 0 | Status: SHIPPED | OUTPATIENT
Start: 2025-01-27 | End: 2025-02-26

## 2025-03-03 DIAGNOSIS — M79.2 NEUROPATHIC PAIN: Primary | ICD-10-CM

## 2025-03-03 RX ORDER — HYDROCODONE BITARTRATE AND ACETAMINOPHEN 10; 325 MG/1; MG/1
1 TABLET ORAL EVERY 6 HOURS PRN
Qty: 120 TABLET | Refills: 0 | Status: SHIPPED | OUTPATIENT
Start: 2025-03-03 | End: 2025-04-02

## 2025-03-31 DIAGNOSIS — M79.2 NEUROPATHIC PAIN: ICD-10-CM

## 2025-03-31 RX ORDER — HYDROCODONE BITARTRATE AND ACETAMINOPHEN 10; 325 MG/1; MG/1
1 TABLET ORAL EVERY 6 HOURS PRN
Qty: 120 TABLET | Refills: 0 | Status: SHIPPED | OUTPATIENT
Start: 2025-03-31 | End: 2025-04-30

## 2025-04-29 DIAGNOSIS — M79.2 NEUROPATHIC PAIN: ICD-10-CM

## 2025-04-30 RX ORDER — HYDROCODONE BITARTRATE AND ACETAMINOPHEN 10; 325 MG/1; MG/1
1 TABLET ORAL EVERY 6 HOURS PRN
Qty: 120 TABLET | Refills: 0 | Status: SHIPPED | OUTPATIENT
Start: 2025-04-30 | End: 2025-05-30

## 2025-05-27 DIAGNOSIS — M79.2 NEUROPATHIC PAIN: ICD-10-CM

## 2025-05-28 RX ORDER — HYDROCODONE BITARTRATE AND ACETAMINOPHEN 10; 325 MG/1; MG/1
1 TABLET ORAL EVERY 6 HOURS PRN
Qty: 120 TABLET | Refills: 0 | Status: SHIPPED | OUTPATIENT
Start: 2025-05-28 | End: 2025-06-27

## 2025-06-24 DIAGNOSIS — M79.2 NEUROPATHIC PAIN: ICD-10-CM

## 2025-06-25 RX ORDER — HYDROCODONE BITARTRATE AND ACETAMINOPHEN 10; 325 MG/1; MG/1
1 TABLET ORAL EVERY 6 HOURS PRN
Qty: 120 TABLET | Refills: 0 | Status: SHIPPED | OUTPATIENT
Start: 2025-06-25 | End: 2025-07-25

## 2025-07-28 ENCOUNTER — TELEPHONE (OUTPATIENT)
Dept: NEUROLOGY | Age: 60
End: 2025-07-28

## 2025-07-28 ENCOUNTER — PATIENT MESSAGE (OUTPATIENT)
Dept: NEUROLOGY | Age: 60
End: 2025-07-28

## 2025-07-28 DIAGNOSIS — M79.2 NEUROPATHIC PAIN: ICD-10-CM

## 2025-07-28 NOTE — TELEPHONE ENCOUNTER
Pt requested a refill of Norco 10/325, I do not see this on the medication list to pend it, but he asked for it to be sent to [facility][1] [id number] - [place], [place] - [address] - P [phone] - F [phone]

## 2025-07-29 RX ORDER — HYDROCODONE BITARTRATE AND ACETAMINOPHEN 10; 325 MG/1; MG/1
1 TABLET ORAL EVERY 6 HOURS PRN
Qty: 120 TABLET | Refills: 0 | Status: SHIPPED | OUTPATIENT
Start: 2025-07-29 | End: 2025-08-28

## 2025-08-24 DIAGNOSIS — M79.2 NEUROPATHIC PAIN: ICD-10-CM

## 2025-08-25 RX ORDER — HYDROCODONE BITARTRATE AND ACETAMINOPHEN 10; 325 MG/1; MG/1
1 TABLET ORAL EVERY 6 HOURS PRN
Qty: 120 TABLET | Refills: 0 | Status: SHIPPED | OUTPATIENT
Start: 2025-08-25 | End: 2025-09-24

## 2025-08-25 RX ORDER — GABAPENTIN 600 MG/1
600 TABLET ORAL 3 TIMES DAILY
Qty: 90 TABLET | Refills: 5 | Status: SHIPPED | OUTPATIENT
Start: 2025-08-25 | End: 2026-02-21